# Patient Record
Sex: FEMALE | Race: ASIAN
[De-identification: names, ages, dates, MRNs, and addresses within clinical notes are randomized per-mention and may not be internally consistent; named-entity substitution may affect disease eponyms.]

---

## 2018-02-27 ENCOUNTER — HOSPITAL ENCOUNTER (EMERGENCY)
Dept: HOSPITAL 17 - NEPD | Age: 68
Discharge: HOME | End: 2018-02-27
Payer: COMMERCIAL

## 2018-02-27 VITALS
SYSTOLIC BLOOD PRESSURE: 199 MMHG | OXYGEN SATURATION: 97 % | RESPIRATION RATE: 16 BRPM | DIASTOLIC BLOOD PRESSURE: 101 MMHG | HEART RATE: 93 BPM

## 2018-02-27 VITALS
OXYGEN SATURATION: 98 % | TEMPERATURE: 98.6 F | RESPIRATION RATE: 16 BRPM | HEART RATE: 89 BPM | DIASTOLIC BLOOD PRESSURE: 102 MMHG | SYSTOLIC BLOOD PRESSURE: 217 MMHG

## 2018-02-27 VITALS
RESPIRATION RATE: 16 BRPM | OXYGEN SATURATION: 100 % | HEART RATE: 72 BPM | SYSTOLIC BLOOD PRESSURE: 170 MMHG | DIASTOLIC BLOOD PRESSURE: 80 MMHG

## 2018-02-27 VITALS
OXYGEN SATURATION: 98 % | HEART RATE: 80 BPM | DIASTOLIC BLOOD PRESSURE: 78 MMHG | RESPIRATION RATE: 18 BRPM | SYSTOLIC BLOOD PRESSURE: 172 MMHG

## 2018-02-27 DIAGNOSIS — I10: Primary | ICD-10-CM

## 2018-02-27 DIAGNOSIS — Z88.2: ICD-10-CM

## 2018-02-27 LAB
BASOPHILS # BLD AUTO: 0 TH/MM3 (ref 0–0.2)
BASOPHILS NFR BLD: 0.4 % (ref 0–2)
BUN SERPL-MCNC: 15 MG/DL (ref 7–18)
CALCIUM SERPL-MCNC: 9.3 MG/DL (ref 8.5–10.1)
CHLORIDE SERPL-SCNC: 101 MEQ/L (ref 98–107)
CREAT SERPL-MCNC: 0.63 MG/DL (ref 0.5–1)
EOSINOPHIL # BLD: 0.1 TH/MM3 (ref 0–0.4)
EOSINOPHIL NFR BLD: 1.5 % (ref 0–4)
ERYTHROCYTE [DISTWIDTH] IN BLOOD BY AUTOMATED COUNT: 14.8 % (ref 11.6–17.2)
GFR SERPLBLD BASED ON 1.73 SQ M-ARVRAT: 94 ML/MIN (ref 89–?)
GLUCOSE SERPL-MCNC: 134 MG/DL (ref 74–106)
HCO3 BLD-SCNC: 31.5 MEQ/L (ref 21–32)
HCT VFR BLD CALC: 39.9 % (ref 35–46)
HGB BLD-MCNC: 13.1 GM/DL (ref 11.6–15.3)
LYMPHOCYTES # BLD AUTO: 2.3 TH/MM3 (ref 1–4.8)
LYMPHOCYTES NFR BLD AUTO: 26.7 % (ref 9–44)
MCH RBC QN AUTO: 26.4 PG (ref 27–34)
MCHC RBC AUTO-ENTMCNC: 32.9 % (ref 32–36)
MCV RBC AUTO: 80.3 FL (ref 80–100)
MONOCYTE #: 0.5 TH/MM3 (ref 0–0.9)
MONOCYTES NFR BLD: 5.8 % (ref 0–8)
NEUTROPHILS # BLD AUTO: 5.5 TH/MM3 (ref 1.8–7.7)
NEUTROPHILS NFR BLD AUTO: 65.6 % (ref 16–70)
PLATELET # BLD: 293 TH/MM3 (ref 150–450)
PMV BLD AUTO: 8.5 FL (ref 7–11)
RBC # BLD AUTO: 4.97 MIL/MM3 (ref 4–5.3)
SODIUM SERPL-SCNC: 137 MEQ/L (ref 136–145)
WBC # BLD AUTO: 8.4 TH/MM3 (ref 4–11)

## 2018-02-27 PROCEDURE — 85025 COMPLETE CBC W/AUTO DIFF WBC: CPT

## 2018-02-27 PROCEDURE — 96374 THER/PROPH/DIAG INJ IV PUSH: CPT

## 2018-02-27 PROCEDURE — 80048 BASIC METABOLIC PNL TOTAL CA: CPT

## 2018-02-27 PROCEDURE — 93005 ELECTROCARDIOGRAM TRACING: CPT

## 2018-02-27 PROCEDURE — 71045 X-RAY EXAM CHEST 1 VIEW: CPT

## 2018-02-27 NOTE — RADRPT
EXAM DATE/TIME:  02/27/2018 16:29 

 

[facility] COMPARISON:     

No previous studies available for comparison.

 

                     

INDICATIONS :     

Cough.

                     

 

MEDICAL HISTORY :     

Hypertension.          

 

SURGICAL HISTORY :     

None.   

 

ENCOUNTER:     

Initial                                        

 

ACUITY:     

1 day      

 

PAIN SCORE:     

0/10

 

LOCATION:     

Bilateral chest 

 

FINDINGS:     

Portable AP view of the chest demonstrates a normal-sized cardiac silhouette. There is questionable m
ild airspace opacity at the right lung base. No pleural effusion or pneumothorax is identified. The b
ones and soft tissues demonstrate no acute abnormality.

 

CONCLUSION:     

Suspected mild patchy airspace consolidation in the right lower lung zone. This could represent an in
fectious process given the clinical history of cough. Suggest followup chest x-ray following appropri
ate treatment to confirm resolution.

 

 

 

 Lazarus Telles MD on February 27, 2018 at 16:49           

Board Certified Radiologist.

 This report was verified electronically.

## 2018-02-27 NOTE — PD
HPI


Chief Complaint:  Hypertension


Time Seen by Provider:  15:59


Travel History


International Travel<30 days:  No


Contact w/Intl Traveler<30days:  No


Traveled to known affect area:  No





History of Present Illness


HPI


67-year-old -American female just arriving from Dr. Walters's next 

office with reports of hypertension.  Patient has been prescribed valsartan/

HCTZ 160/25.  Patient admits that she is not normally compliant with her 

medications.  Patient denies headache or other symptoms of hypertension.  

Patient states she took 1 of her medications about an hour prior to exam.  She 

states she has been on the blood pressure medication for approximately 2 years.

  She currently has no other symptoms.  She is allergic to sulfa.





[facility]


Past Medical History


Cardiovascular Problems:  Yes





Social History


Alcohol Use:  Yes


Tobacco Use:  No


Substance Use:  No





Allergies-Medications


(Allergen,Severity, Reaction):  


Coded Allergies:  


     Sulfa (Sulfonamide Antibiotics) (Verified  Allergy, Intermediate, 2/27/18)


Reported Meds & Prescriptions





Reported Meds & Active Scripts


Active


No Active Prescriptions or Reported Medications    








Review of Systems


Except as stated in HPI:  all other systems reviewed are Neg


General / Constitutional:  No: Fever


Eyes:  No: Visual changes


HENT:  No: Headaches


Cardiovascular:  No: Chest Pain or Discomfort


Respiratory:  No: Shortness of Breath


Gastrointestinal:  No: Abdominal Pain


Genitourinary:  No: Dysuria


Musculoskeletal:  No: Pain


Skin:  No Rash


Neurologic:  No: Weakness


Psychiatric:  No: Depression


Endocrine:  No: Polydipsia


Hematologic/Lymphatic:  No: Easy Bruising





Physical Exam


Narrative


GENERAL: Patient appears in no acute distress.  Vital signs are reviewed.


SKIN: Warm and dry.  Normal color.  Normal turgor.


HEAD: Atraumatic. Normocephalic. 


EYES: Pupils equal and round. No scleral icterus. No injection or drainage. 


ENT: No nasal bleeding or discharge.  Mucous membranes pink and moist.  Pharynx 

is clear.  Airways patent


NECK: Trachea midline. No JVD.  Supple and nontender


CARDIOVASCULAR: Regular rate and rhythm.  No murmurs gallops or rubs


RESPIRATORY: No accessory muscle use. Clear to auscultation. Breath sounds 

equal bilaterally. 


GASTROINTESTINAL: Abdomen soft, non-tender, nondistended. Hepatic and splenic 

margins not palpable.  No pulsatile masses per


MUSCULOSKELETAL: Extremities without clubbing, cyanosis, or edema. No obvious 

deformities. 


NEUROLOGICAL: Awake and alert. No obvious cranial nerve deficits.  Motor 

grossly within normal limits. Five out of 5 muscle strength in the arms and 

legs.  Normal speech.


PSYCHIATRIC: Appropriate mood and affect; insight and judgment normal.





Data


Data


Last Documented VS





Vital Signs








  Date Time  Temp Pulse Resp B/P (MAP) Pulse Ox O2 Delivery O2 Flow Rate FiO2


 


2/27/18 17:03  80 18 172/78 (109) 98 Room Air  


 


2/27/18 15:14 98.6       








Orders





 Orders


Basic Metabolic Panel (Bmp) (2/27/18 16:11)


Complete Blood Count With Diff (2/27/18 16:11)


Iv Access Insert/Monitor (2/27/18 16:11)


Ecg Monitoring (2/27/18 16:11)


Oximetry (2/27/18 16:11)


Sodium Chloride 0.9% Flush (Ns Flush) (2/27/18 16:15)


Electrocardiogram (2/27/18 16:11)


Chest, Single Ap (2/27/18 16:11)


Diltiazem Inj (Cardizem Inj) (2/27/18 16:45)





Labs





Laboratory Tests








Test


  2/27/18


16:15


 


White Blood Count 8.4 TH/MM3 


 


Red Blood Count 4.97 MIL/MM3 


 


Hemoglobin 13.1 GM/DL 


 


Hematocrit 39.9 % 


 


Mean Corpuscular Volume 80.3 FL 


 


Mean Corpuscular Hemoglobin 26.4 PG 


 


Mean Corpuscular Hemoglobin


Concent 32.9 % 


 


 


Red Cell Distribution Width 14.8 % 


 


Platelet Count 293 TH/MM3 


 


Mean Platelet Volume 8.5 FL 


 


Neutrophils (%) (Auto) 65.6 % 


 


Lymphocytes (%) (Auto) 26.7 % 


 


Monocytes (%) (Auto) 5.8 % 


 


Eosinophils (%) (Auto) 1.5 % 


 


Basophils (%) (Auto) 0.4 % 


 


Neutrophils # (Auto) 5.5 TH/MM3 


 


Lymphocytes # (Auto) 2.3 TH/MM3 


 


Monocytes # (Auto) 0.5 TH/MM3 


 


Eosinophils # (Auto) 0.1 TH/MM3 


 


Basophils # (Auto) 0.0 TH/MM3 


 


CBC Comment DIFF FINAL 


 


Differential Comment  


 


Blood Urea Nitrogen 15 MG/DL 


 


Creatinine 0.63 MG/DL 


 


Random Glucose 134 MG/DL 


 


Calcium Level 9.3 MG/DL 


 


Sodium Level 137 MEQ/L 


 


Potassium Level 3.5 MEQ/L 


 


Chloride Level 101 MEQ/L 


 


Carbon Dioxide Level 31.5 MEQ/L 


 


Anion Gap 5 MEQ/L 


 


Estimat Glomerular Filtration


Rate 94 ML/MIN 


 











MDM


Medical Decision Making


Medical Screen Exam Complete:  Yes


Emergency Medical Condition:  Yes


Differential Diagnosis


Hypertensive urgency.  Hypertensive crisis.  Noncompliance.


Narrative Course


Patient is medically stable at time of exam.


Labs ordered including CBC, BNP, and EKG.


Chest x-ray is ordered.


IV access is obtained.


Recheck her blood pressure shows to be 199/101.


CBC is unremarkable.


Chest x-ray shows possible small infiltrate in the right lower lung although 

patient is asymptomatic.


Patient is given 10 mg diltiazem IV.


EKG shows normal sinus rhythm.


BMP is unremarkable except for a random glucose of 134.


Patient is monitored and recheck of blood pressure shows 172/74.


Patient will be continued on her valsartan/hydrochlorothiazide daily.


Patient is to start amlodipine 5 mg every morning.  #30.


Patient is to monitor her blood pressure once daily at home.


Patient to follow with her primary care physician in the next week.





Diagnosis





 Primary Impression:  


 Hypertension


 Qualified Codes:  I10 - Essential (primary) hypertension


Referrals:  


SRINIVAS Hook MD


1 week


Patient Instructions:  2 Gram Sodium Diet (DC), General Instructions





***Additional Instructions:  


Patient is monitored and recheck of blood pressure shows 172/74.


Patient will be continued on her valsartan/hydrochlorothiazide daily.


Patient is to start amlodipine 5 mg every morning.  #30.


Patient is to monitor her blood pressure once daily at home.


Patient to follow with her primary care physician in the next week.


***Med/Other Pt SpecificInfo:  Prescription(s) given


Scripts


No Active Prescriptions or Reported Meds


Disposition:  01 DISCHARGE HOME


Condition:  Stable











Ferny Stephens Feb 27, 2018 16:15

## 2018-02-28 NOTE — EKG
Date Performed: 02/27/2018       Time Performed: 16:28:43

 

PTAGE:      67 years

 

EKG:      Sinus rhythm 

 

 NORMAL ECG

 

PREVIOUS TRACING       : 04/30/1998 10.24 Since the prior tracing, there has been no significant wilks


 

DOCTOR:   Dipak Calzada  Interpretating Date/Time  02/28/2018 12:30:15